# Patient Record
Sex: FEMALE | NOT HISPANIC OR LATINO | Employment: FULL TIME | URBAN - METROPOLITAN AREA
[De-identification: names, ages, dates, MRNs, and addresses within clinical notes are randomized per-mention and may not be internally consistent; named-entity substitution may affect disease eponyms.]

---

## 2017-10-30 ENCOUNTER — OFFICE VISIT (OUTPATIENT)
Dept: FAMILY MEDICINE | Facility: CLINIC | Age: 52
End: 2017-10-30
Payer: COMMERCIAL

## 2017-10-30 VITALS — TEMPERATURE: 98.1 F | DIASTOLIC BLOOD PRESSURE: 60 MMHG | SYSTOLIC BLOOD PRESSURE: 100 MMHG

## 2017-10-30 DIAGNOSIS — Z71.89 OTHER SPECIFIED COUNSELING: ICD-10-CM

## 2017-10-30 DIAGNOSIS — Z71.84 TRAVEL ADVICE ENCOUNTER: Primary | ICD-10-CM

## 2017-10-30 PROCEDURE — 99402 PREV MED CNSL INDIV APPRX 30: CPT | Mod: GA | Performed by: NURSE PRACTITIONER

## 2017-10-30 RX ORDER — AZITHROMYCIN 250 MG/1
TABLET, FILM COATED ORAL
Qty: 6 TABLET | Refills: 1 | Status: SHIPPED | OUTPATIENT
Start: 2017-10-30 | End: 2018-08-08

## 2017-10-30 RX ORDER — OSELTAMIVIR PHOSPHATE 75 MG/1
75 CAPSULE ORAL DAILY
Qty: 10 CAPSULE | Refills: 0 | Status: SHIPPED | OUTPATIENT
Start: 2017-10-30 | End: 2018-08-08

## 2017-10-30 RX ORDER — KETOCONAZOLE 20 MG/G
CREAM TOPICAL DAILY
Qty: 15 G | Refills: 0 | Status: SHIPPED | OUTPATIENT
Start: 2017-10-30

## 2017-10-30 RX ORDER — AZITHROMYCIN 500 MG/1
500 TABLET, FILM COATED ORAL DAILY
Qty: 3 TABLET | Refills: 0 | Status: SHIPPED | OUTPATIENT
Start: 2017-10-30 | End: 2017-11-02

## 2017-10-30 NOTE — PATIENT INSTRUCTIONS
Today October 30, 2017 you received the    None today    These appointments can be made as a NURSE ONLY visit.    **It is very important for the vaccinations to be given on the scheduled day(s), this helps ensure you receive the full effectiveness of the vaccine.**    Please call [facility] with any questions [phone]    Thank you for visiting [facility]

## 2017-10-30 NOTE — PROGRESS NOTES
Nurse Note      Itinerary:  [place]      Departure Date: November 2017      Return Date: 11/25/17      Length of Trip 2 weeks      Reason for Travel: [employer] work           Urban or rural: both      Accommodations: Hotel        IMMUNIZATION HISTORY  Have you received any immunizations within the past 4 weeks?  No  Have you ever fainted from having your blood drawn or from an injection?  No  Have you ever had a fever reaction to vaccination?  No  Have you ever had any bad reaction or side effect from any vaccination?  No  Have you ever had hepatitis A or B vaccine?  yes  Do you live (or work closely) with anyone who has AIDS, an AIDS-like condition, any other immune disorder or who is on chemotherapy for cancer?  No  Do you have a family history of immunodeficiency?  No  Have you received any injection of immune globulin or any blood products during the past 12 months?  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  Patient will be departing in  2 week(s) and staying for   2 week(s) and  traveling with [employer].      Patient itinerary :  will be in the [facility] which presents risk for food borne illnesses, motor vehicle accidents and Typhoid. exposure.      Patient's activities will include volunteer work.    Patient's country of birth is [place]    Special medical concerns:  Excerciiseasthma, Hashimotos  Pre-travel questionnaire was completed by patient and reviewed by provider.     Vitals: /60  Temp 98.1  F (36.7  C) (Oral)  BMI= There is no height or weight on file to calculate BMI.    EXAM:  General:  Well-nourished, well-developed in no acute distress.  Appears to be stated age, interacts appropriately and expresses understanding of information given to patient.    Current Outpatient Prescriptions   Medication Sig Dispense Refill      ARMOUR THYROID PO        Cholecalciferol (VITAMIN D3 PO) Take by mouth daily       Riboflavin (VITAMIN B-2 PO)        Tobramycin-dexamethasone 0.3-0.05 % SUSP Place 1 drop into the right eye 3 times daily 1 Bottle 11     fluticasone-salmeterol (ADVAIR DISKUS) 100-50 MCG/DOSE diskus inhaler Inhale 1 puff into the lungs.       montelukast (SINGULAIR) 10 MG tablet Take 10 mg by mouth.       Patient Active Problem List   Diagnosis     DAIN (stress urinary incontinence, female)     Other hammer toe (acquired)     Allergies   Allergen Reactions     Latex      Per  H&P, allergic to latex gloves     Sulfa Drugs          Immunizations discussed include:   Hepatitis A:  Up to date  Hepatitis B: Up to date  Influenza: Declined  Is concerned about risks of vaccine despite discussion of facts vs myths  Typhoid: Declined  Not concerned about risk of disease  Rabies: Declined  Not concerned about risk of disease  Yellow Fever: Not indicated  Japanese Encephalitis: Not indicated - risk of disease is minimal low rsk season  Meningococcus: Not indicated  Tetanus/Diphtheria: Up to date  Measles/Mumps/Rubella: Up to date  Cholera: Not needed  Polio: Up to date  Pneumococcal: Up to date  Varicella: Immune by disease history per patient report  Zostavax:  Up to date  HPV:  Not indicated  TB:  Low risk     Altitude Exposure on this trip: no  Past tolerance to Altitude: na    ASSESSMENT/PLAN:    ICD-10-CM    1. Travel advice encounter Z71.89 azithromycin (ZITHROMAX) 250 MG tablet     oseltamivir (TAMIFLU) 75 MG capsule     azithromycin (ZITHROMAX) 500 MG tablet   2. Other specified counseling Z71.89 azithromycin (ZITHROMAX) 250 MG tablet     ketoconazole (NIZORAL) 2 % cream     I have reviewed general recommendations for safe travel   including: food/water precautions, insect precautions, safer sex   practices given high prevalence of Zika, HIV and other STDs,   roadway safety. Educational materials and [employer] report provided.    Rebecca  prophylaxis recommended: none  Symptomatic treatment for traveler's diarrhea: azithromycin  Altitude illness prevention and treatment: non      Evacuation insurance advised and resources were provided to patient.    Total visit time 30 minutes  with over 50% of time spent counseling patient as detailed above.    Callie Valentine CNP    Additional note:  10 minutes  Discussed history of asthma and tendency towards bronchial infections.  Patient requesting Zithromax Zpack for coverage in the event of issues in [place] (Pollution).  I also prescribed Tamiflu as she declines a flu shot and is at risk for resiratory complications of influenza. (can be taken as prevention or treatment and instructions we given on dosing) .   Kenalog refill for skin rashes.   Callie Valentine CNP

## 2017-10-30 NOTE — MR AVS SNAPSHOT
"              After Visit Summary   10/30/2017    Misti Elmore    MRN: [id number]           Patient Information     Date Of Birth          1965        Visit Information        Provider Department      10/30/2017 8:00 AM Callie Valentine APRN CNP [facility]        Today's Diagnoses     Travel advice encounter    -  1    Other specified counseling          Care Instructions    Today October 30, 2017 you received the    None today    These appointments can be made as a NURSE ONLY visit.    **It is very important for the vaccinations to be given on the scheduled day(s), this helps ensure you receive the full effectiveness of the vaccine.**    Please call [facility] with any questions [phone]    Thank you for visiting [facility]              Follow-ups after your visit        Who to contact     If you have questions or need follow up information about today's clinic visit or your schedule please contact [facility] directly at [phone].  Normal or non-critical lab and imaging results will be communicated to you by Appseehart, letter or phone within 4 business days after the clinic has received the results. If you do not hear from us within 7 days, please contact the clinic through Appseehart or phone. If you have a critical or abnormal lab result, we will notify you by phone as soon as possible.  Submit refill requests through [employer] or call your pharmacy and they will forward the refill request to us. Please allow 3 business days for your refill to be completed.          Additional Information About Your Visit        MyChart Information     [employer] lets you send messages to your doctor, view your test results, renew your prescriptions, schedule appointments and more. To sign up, go to [email or web address] . Click on \"Log in\" on the left side of the screen, which will take you to the Welcome page. Then click on \"Sign up Now\" on the right " side of the page.     You will be asked to enter the access code listed below, as well as some personal information. Please follow the directions to create your username and password.     Your access code is: [email or web address]  Expires: 2018  8:49 AM     Your access code will  in 90 days. If you need help or a new code, please call your [place] clinic or [phone].        Care EveryWhere ID     This is your Care EveryWhere ID. This could be used by other organizations to access your [place] medical records  [id number]        Your Vitals Were     Temperature                   98.1  F (36.7  C) (Oral)            Blood Pressure from Last 3 Encounters:   10/30/17 100/60   02/15/16 117/75   09/15/14 90/60    Weight from Last 3 Encounters:   02/15/16 199 lb (90.3 kg)   09/15/14 186 lb (84.4 kg)   14 183 lb (83 kg)              Today, you had the following     No orders found for display         Today's Medication Changes          These changes are accurate as of: 10/30/17  8:49 AM.  If you have any questions, ask your nurse or doctor.               Start taking these medicines.        Dose/Directions    ketoconazole 2 % cream   Commonly known as:  NIZORAL   Used for:  Other specified counseling   Started by:  Callie Valentine APRN CNP        Apply topically daily   Quantity:  15 g   Refills:  0       oseltamivir 75 MG capsule   Commonly known as:  TAMIFLU   Used for:  Travel advice encounter   Started by:  Callie Valentine APRN CNP        Dose:  75 mg   Take 1 capsule (75 mg) by mouth daily   Quantity:  10 capsule   Refills:  0         These medicines have changed or have updated prescriptions.        Dose/Directions    * azithromycin 250 MG tablet   Commonly known as:  ZITHROMAX   This may have changed:  Another medication with the same name was added. Make sure you understand how and when to take each.   Used for:  Travel advice encounter, Other specified counseling   Changed by:  Callie Valentine  KELLEE Washington CNP        Two tablets first day, then one tablet daily for four days.   Quantity:  6 tablet   Refills:  1       * azithromycin 500 MG tablet   Commonly known as:  ZITHROMAX   This may have changed:  You were already taking a medication with the same name, and this prescription was added. Make sure you understand how and when to take each.   Used for:  Travel advice encounter   Changed by:  Callie Valentine APRN CNP        Dose:  500 mg   Take 1 tablet (500 mg) by mouth daily for 3 doses Take 1 tablet a day for up to 3 days for severe diarrhea   Quantity:  3 tablet   Refills:  0       * Notice:  This list has 2 medication(s) that are the same as other medications prescribed for you. Read the directions carefully, and ask your doctor or other care provider to review them with you.         Where to get your medicines      These medications were sent to [facility] [id number] [place] [address] [address] AT [address]  [address] [facility] [address]     Phone:  [phone]     azithromycin 250 MG tablet    azithromycin 500 MG tablet    ketoconazole 2 % cream    oseltamivir 75 MG capsule                Primary Care Provider Office Phone # Fax #    [facility] [phone]       [address] [place] [address], [address]  [facility] [address]        Equal Access to Services     SILVESTRE GARCIA AH: Hadii darcie ku hadasho Soomaali, waaxda luqadaha, qaybta kaalmada adeegyada, waxbasia romero haysarika alatorre. So [facility] [phone].    ATENCIÓN: Si habla español, tiene a izquierdo disposición servicios gratuitos de asistencia lingüística. Llame al [phone].    We comply with applicable federal civil rights laws and [place] laws. We do not discriminate on the basis of race, color, national origin, age, disability, sex, sexual orientation, or gender identity.            Thank you!     Thank you for choosing [facility]  for your care. Our goal  is always to provide you with excellent care. Hearing back from our patients is one way we can continue to improve our services. Please take a few minutes to complete the written survey that you may receive in the mail after your visit with us. Thank you!             Your Updated Medication List - Protect others around you: Learn how to safely use, store and throw away your medicines at www.disposemymeds.org.          This list is accurate as of: 10/30/17  8:49 AM.  Always use your most recent med list.                   Brand Name Dispense Instructions for use Diagnosis    ADVAIR DISKUS 100-50 MCG/DOSE diskus inhaler   Generic drug:  fluticasone-salmeterol      Inhale 1 puff into the lungs.        ARMOUR THYROID PO           * azithromycin 250 MG tablet    ZITHROMAX    6 tablet    Two tablets first day, then one tablet daily for four days.    Travel advice encounter, Other specified counseling       * azithromycin 500 MG tablet    ZITHROMAX    3 tablet    Take 1 tablet (500 mg) by mouth daily for 3 doses Take 1 tablet a day for up to 3 days for severe diarrhea    Travel advice encounter       ketoconazole 2 % cream    NIZORAL    15 g    Apply topically daily    Other specified counseling       montelukast 10 MG tablet    SINGULAIR     Take 10 mg by mouth.        oseltamivir 75 MG capsule    TAMIFLU    10 capsule    Take 1 capsule (75 mg) by mouth daily    Travel advice encounter       tobramycin-dexamethasone 0.3-0.05 % Susp     1 Bottle    Place 1 drop into the right eye 3 times daily    Other chronic allergic conjunctivitis       VITAMIN B-2 PO           VITAMIN D3 PO      Take by mouth daily        * Notice:  This list has 2 medication(s) that are the same as other medications prescribed for you. Read the directions carefully, and ask your doctor or other care provider to review them with you.

## 2017-10-30 NOTE — NURSING NOTE
"Chief Complaint   Patient presents with     [facility]     initial /60  Temp 98.1  F (36.7  C) (Oral) Estimated body mass index is 35.25 kg/(m^2) as calculated from the following:    Height as of 9/15/14: 5' 3\" (1.6 m).    Weight as of 2/15/16: 199 lb (90.3 kg).  BP completed using cuff size: regular.  L   arm      Health Maintenance that is potentially due pending provider review:  NONE    n/a    Alberto Hull ma  "

## 2018-03-13 ENCOUNTER — HOSPITAL ENCOUNTER (OUTPATIENT)
Dept: MAMMOGRAPHY | Facility: CLINIC | Age: 53
Discharge: HOME | End: 2018-03-13
Payer: COMMERCIAL

## 2018-03-13 DIAGNOSIS — Z12.31 ENCOUNTER FOR SCREENING MAMMOGRAM FOR HIGH-RISK PATIENT: ICD-10-CM

## 2018-03-13 PROCEDURE — 77063 BREAST TOMOSYNTHESIS BI: CPT

## 2018-05-11 ENCOUNTER — HOSPITAL ENCOUNTER (OUTPATIENT)
Facility: CLINIC | Age: 53
Discharge: HOME | End: 2018-05-11
Attending: COLON & RECTAL SURGERY | Admitting: COLON & RECTAL SURGERY
Payer: COMMERCIAL

## 2018-05-11 VITALS
SYSTOLIC BLOOD PRESSURE: 112 MMHG | DIASTOLIC BLOOD PRESSURE: 75 MMHG | RESPIRATION RATE: 14 BRPM | OXYGEN SATURATION: 97 % | BODY MASS INDEX: 33.66 KG/M2 | WEIGHT: 190 LBS | HEIGHT: 63 IN

## 2018-05-11 LAB — COLONOSCOPY: NORMAL

## 2018-05-11 PROCEDURE — 25000128 H RX IP 250 OP 636: Performed by: COLON & RECTAL SURGERY

## 2018-05-11 PROCEDURE — G0500 MOD SEDAT ENDO SERVICE >5YRS: HCPCS | Performed by: COLON & RECTAL SURGERY

## 2018-05-11 PROCEDURE — 45378 DIAGNOSTIC COLONOSCOPY: CPT | Performed by: COLON & RECTAL SURGERY

## 2018-05-11 PROCEDURE — G0121 COLON CA SCRN NOT HI RSK IND: HCPCS | Performed by: COLON & RECTAL SURGERY

## 2018-05-11 RX ORDER — ONDANSETRON 2 MG/ML
4 INJECTION INTRAMUSCULAR; INTRAVENOUS EVERY 6 HOURS PRN
Status: DISCONTINUED | OUTPATIENT
Start: 2018-05-11 | End: 2018-05-11 | Stop reason: HOSPADM

## 2018-05-11 RX ORDER — FLUMAZENIL 0.1 MG/ML
0.2 INJECTION, SOLUTION INTRAVENOUS
Status: DISCONTINUED | OUTPATIENT
Start: 2018-05-11 | End: 2018-05-11 | Stop reason: HOSPADM

## 2018-05-11 RX ORDER — FENTANYL CITRATE 50 UG/ML
INJECTION, SOLUTION INTRAMUSCULAR; INTRAVENOUS PRN
Status: DISCONTINUED | OUTPATIENT
Start: 2018-05-11 | End: 2018-05-11 | Stop reason: HOSPADM

## 2018-05-11 RX ORDER — ONDANSETRON 2 MG/ML
4 INJECTION INTRAMUSCULAR; INTRAVENOUS
Status: DISCONTINUED | OUTPATIENT
Start: 2018-05-11 | End: 2018-05-11 | Stop reason: HOSPADM

## 2018-05-11 RX ORDER — NALOXONE HYDROCHLORIDE 0.4 MG/ML
.1-.4 INJECTION, SOLUTION INTRAMUSCULAR; INTRAVENOUS; SUBCUTANEOUS
Status: DISCONTINUED | OUTPATIENT
Start: 2018-05-11 | End: 2018-05-11 | Stop reason: HOSPADM

## 2018-05-11 RX ORDER — LIDOCAINE 40 MG/G
CREAM TOPICAL
Status: DISCONTINUED | OUTPATIENT
Start: 2018-05-11 | End: 2018-05-11 | Stop reason: HOSPADM

## 2018-05-11 RX ORDER — ONDANSETRON 4 MG/1
4 TABLET, ORALLY DISINTEGRATING ORAL EVERY 6 HOURS PRN
Status: DISCONTINUED | OUTPATIENT
Start: 2018-05-11 | End: 2018-05-11 | Stop reason: HOSPADM

## 2018-05-11 NOTE — H&P
Pre-Endoscopy History and Physical     Misti Elmore MRN# [id number]   YOB: 1965 Age: 53 year old     Date of Procedure: 5/11/2018  Primary care provider: [facility]  Type of Endoscopy: Colonoscopy  Reason for Procedure: screening  Type of Anesthesia Anticipated: Moderate Sedation    HPI:    Misti is a 53 year old female who will be undergoing the above procedure.      A history and physical has been performed. The patient's medications and allergies have been reviewed. The risks and benefits of the procedure and the sedation options and risks were discussed with the patient.  All questions were answered and informed consent was obtained.      She denies a personal or family history of anesthesia complications or bleeding disorders.     Allergies   Allergen Reactions     Latex      Per  H&P, allergic to latex gloves     Sulfa Drugs      Got red and irritated?          No current facility-administered medications on file prior to encounter.   Current Outpatient Prescriptions on File Prior to Encounter:  ARMOUR THYROID PO    Cholecalciferol (VITAMIN D3 PO) Take by mouth daily   azithromycin (ZITHROMAX) 250 MG tablet Two tablets first day, then one tablet daily for four days.   fluticasone-salmeterol (ADVAIR DISKUS) 100-50 MCG/DOSE diskus inhaler Inhale 1 puff into the lungs.   ketoconazole (NIZORAL) 2 % cream Apply topically daily   montelukast (SINGULAIR) 10 MG tablet Take 10 mg by mouth.   oseltamivir (TAMIFLU) 75 MG capsule Take 1 capsule (75 mg) by mouth daily   Tobramycin-dexamethasone 0.3-0.05 % SUSP Place 1 drop into the right eye 3 times daily       Patient Active Problem List   Diagnosis     DAIN (stress urinary incontinence, female)     Other hammer toe (acquired)        Past Medical History:   Diagnosis Date     Asthma     exercise induced     Rotator cuff injury     right     Thyroid disease     hypothyroidism     Unspecified symptom associated with female  "genital organs         Past Surgical History:   Procedure Laterality Date     GYN SURGERY        section x3 and tubal ligation     SLING TRANSVAGINAL  2011    Procedure:SLING TRANSVAGINAL; Suburethral Soleyx Sling with cysto (LATEX ALLERGY - CONTACT); Surgeon:JEANETTE MAYA; Location:[facility]       Social History   Substance Use Topics     Smoking status: Never Smoker     Smokeless tobacco: Never Used     Alcohol use Yes      Comment: 1 X PER MONTH       Family History   Problem Relation Age of Onset     Strabismus No family hx of        REVIEW OF SYSTEMS:     5 point ROS negative except as noted above in HPI, including Gen., Resp., CV, GI &  system review.      PHYSICAL EXAM:   /83  Resp 16  Ht 1.588 m (5' 2.5\")  Wt 86.2 kg (190 lb)  SpO2 96%  BMI 34.2 kg/m2 Estimated body mass index is 34.2 kg/(m^2) as calculated from the following:    Height as of this encounter: 1.588 m (5' 2.5\").    Weight as of this encounter: 86.2 kg (190 lb).   GENERAL APPEARANCE: healthy and alert  MENTAL STATUS: alert  AIRWAY EXAM: Mallampatti Class I (visualization of the soft palate, fauces, uvula, anterior and posterior pillars)  RESP: lungs clear to auscultation - no rales, rhonchi or wheezes  CV: regular rates and rhythm      IMPRESSION   ASA Class 2 - Mild systemic disease        PLAN:     Plan for colonoscopy. We discussed the risks, benefits and alternatives and the patient wished to proceed.    The above has been forwarded to the consulting provider.      Cindy Lares MD  [facility]  Phone: [phone]  Fax: [phone]  May 11, 2018    "

## 2018-07-22 DIAGNOSIS — Z71.84 TRAVEL ADVICE ENCOUNTER: ICD-10-CM

## 2018-07-23 RX ORDER — AZITHROMYCIN 500 MG/1
TABLET, FILM COATED ORAL
Start: 2018-07-23

## 2018-08-08 ENCOUNTER — OFFICE VISIT (OUTPATIENT)
Dept: URGENT CARE | Facility: URGENT CARE | Age: 53
End: 2018-08-08
Payer: COMMERCIAL

## 2018-08-08 ENCOUNTER — RADIANT APPOINTMENT (OUTPATIENT)
Dept: GENERAL RADIOLOGY | Facility: CLINIC | Age: 53
End: 2018-08-08
Attending: PHYSICIAN ASSISTANT
Payer: COMMERCIAL

## 2018-08-08 VITALS
DIASTOLIC BLOOD PRESSURE: 85 MMHG | OXYGEN SATURATION: 97 % | SYSTOLIC BLOOD PRESSURE: 129 MMHG | TEMPERATURE: 98 F | HEART RATE: 78 BPM

## 2018-08-08 DIAGNOSIS — R82.90 FOUL SMELLING URINE: ICD-10-CM

## 2018-08-08 DIAGNOSIS — R05.9 COUGH: ICD-10-CM

## 2018-08-08 DIAGNOSIS — J22 LOWER RESP. TRACT INFECTION: Primary | ICD-10-CM

## 2018-08-08 LAB
ALBUMIN UR-MCNC: NEGATIVE MG/DL
APPEARANCE UR: CLEAR
BACTERIA #/AREA URNS HPF: ABNORMAL /HPF
BILIRUB UR QL STRIP: NEGATIVE
COLOR UR AUTO: YELLOW
GLUCOSE UR STRIP-MCNC: NEGATIVE MG/DL
HGB UR QL STRIP: ABNORMAL
KETONES UR STRIP-MCNC: NEGATIVE MG/DL
LEUKOCYTE ESTERASE UR QL STRIP: NEGATIVE
NITRATE UR QL: NEGATIVE
PH UR STRIP: 5 PH (ref 5–7)
RBC #/AREA URNS AUTO: ABNORMAL /HPF
SOURCE: ABNORMAL
SP GR UR STRIP: 1.02 (ref 1–1.03)
UROBILINOGEN UR STRIP-ACNC: 0.2 EU/DL (ref 0.2–1)
WBC #/AREA URNS AUTO: ABNORMAL /HPF

## 2018-08-08 PROCEDURE — 71046 X-RAY EXAM CHEST 2 VIEWS: CPT

## 2018-08-08 PROCEDURE — 81001 URINALYSIS AUTO W/SCOPE: CPT | Performed by: PHYSICIAN ASSISTANT

## 2018-08-08 PROCEDURE — 99213 OFFICE O/P EST LOW 20 MIN: CPT | Performed by: PHYSICIAN ASSISTANT

## 2018-08-08 RX ORDER — AZITHROMYCIN 250 MG/1
TABLET, FILM COATED ORAL
Qty: 6 TABLET | Refills: 0 | Status: SHIPPED | OUTPATIENT
Start: 2018-08-08 | End: 2021-02-02

## 2018-08-08 NOTE — MR AVS SNAPSHOT
After Visit Summary   8/8/2018    Misti Elmore    MRN: [id number]           Patient Information     Date Of Birth          1965        Visit Information        Provider Department      8/8/2018 7:45 PM Gillian Carrillo PA-C [facility]        Today's Diagnoses     Cough    -  1    Foul smelling urine        Lower resp. tract infection          Care Instructions    Guaifenesin for expectorant.       Preventing Common Respiratory Infections  Respiratory infections such as colds and influenza ( the flu ) are common in winter. These infections are often caused by viruses. They may share some symptoms, but not all respiratory infections are the same. Some make you more sick than others. You can take steps to prevent common respiratory infections. And if you get sick, you can take care of yourself to keep the infection from getting worse.    What is a cold?    Symptoms include runny nose, coughing and sneezing, and sore throat. Cold symptoms tend to be milder than flu symptoms.    Symptoms tend to come on slowly. They last for a few days to about a week.    With a cold, you can still do most of the things you usually do.  What is the flu?    Symptoms include fever, headache, fatigue, cough, sore throat, runny nose, and muscle aches. Children may have upset stomach and vomiting, but adults usually don t.    Symptoms tend to come on quickly. Some, such as fatigue and cough, can last a few weeks.    With the flu, you may feel worn out and not able to do normal activities.    It s most likely NOT the flu if an adult has vomiting or diarrhea for a day or two. This so-called  stomach flu  is probably a GI (gastrointestinal) infection.  When the infection gets worse  Without proper care, a respiratory infection can get worse. It can lead to serious complications and death. If you aren t getting better, call your healthcare provider. Complications can include:    Bronchitis  (infection of the airways that leads to shortness of breath and coughing up thick yellow or green mucus)    Pneumonia (infection of the lungs in which fluid and mucus settle in the lungs, making breathing difficult)    Worsening of chronic conditions such as heart failure, chronic lung disease, asthma, or diabetes    Severe dehydration (loss of fluids)    Sinus problems    Ear infections   Get a flu vaccine  A flu vaccine protects you from influenza (but not other colds or infections). Get a vaccine each fall, before flu season starts. This can be done at a clinic, healthcare provider s office, drugstore, [facility] center, or through your workplace.  Get pneumococcal vaccines  Pneumonia can be a complication of influenza. There are 2 pneumococcal pneumonia vaccines that protect against many types of pneumonia. Talk with your healthcare provider about these important vaccines.   Keep germs from spreading  No one likes getting sick. To protect yourself and others from cold and flu germs:    Wash your hands often. Use alcohol-based hand  when you don t have access to soap and water.    Don t touch your eyes, nose, and mouth. This may help you keep germs out of your body.    Try to avoid people with respiratory infections. You may want to stay out of crowds during flu season (winter).    Ask your healthcare provider if you should get a pneumonia vaccination.  How to wash your hands    Use warm water and plenty of soap. Work up a good lather.    Clean your whole hand, under your nails, between your fingers, and up your wrists. Wash for at least 15 to 20 seconds. Don t just wipe--rub well.    Rinse. Let the water run down your fingertips, not up your wrists.    In a public restroom, use a paper towel to turn off the faucet and open the door.   Date Last Reviewed: 12/1/2016 2000-2017 [employer]. [address] [place] [address], [place] [address]. All rights reserved. This information is not intended as a  substitute for professional medical care. Always follow your healthcare professional's instructions.                Follow-ups after your visit        Who to contact     If you have questions or need follow up information about today's clinic visit or your schedule please contact [facility] directly at [phone].  Normal or non-critical lab and imaging results will be communicated to you by MyChart, letter or phone within 4 business days after the clinic has received the results. If you do not hear from us within 7 days, please contact the clinic through MyChart or phone. If you have a critical or abnormal lab result, we will notify you by phone as soon as possible.  Submit refill requests through [employer] or call your pharmacy and they will forward the refill request to us. Please allow 3 business days for your refill to be completed.          Additional Information About Your Visit        Care EveryWhere ID     This is your Care EveryWhere ID. This could be used by other organizations to access your [facility] medical records  [id number]        Your Vitals Were     Pulse Temperature Pulse Oximetry             78 98  F (36.7  C) (Oral) 97%          Blood Pressure from Last 3 Encounters:   08/08/18 129/85   05/11/18 112/75   10/30/17 100/60    Weight from Last 3 Encounters:   05/11/18 190 lb (86.2 kg)   02/15/16 199 lb (90.3 kg)   09/15/14 186 lb (84.4 kg)              We Performed the Following     UA with Microscopic reflex to Culture          Today's Medication Changes          These changes are accurate as of 8/8/18  8:35 PM.  If you have any questions, ask your nurse or doctor.               Stop taking these medicines if you haven't already. Please contact your care team if you have questions.     oseltamivir 75 MG capsule   Commonly known as:  TAMIFLU   Stopped by:  Gillian Carrillo PA-C           tobramycin-dexamethasone 0.3-0.05 % Susp   Stopped by:  Gillian Carrillo  BELINDA                Where to get your medicines      These medications were sent to [id number] - [place], [place] - [address] & [facility]  [address] [place] [address]    Hours:  24-hours Phone:  [phone]     azithromycin 250 MG tablet                Primary Care Provider Office Phone # Fax #    [place] [facility] [phone]       [facility] [facility], [address]  [place] [address]        Equal Access to Services     [facility]: Hadii aad ku hadasho Soomaali, waaxda luqadaha, qaybta kaalmada adeegyada, waxay idiin hayaan sabina edward . So [facility] [phone].    ATENCIÓN: Si habla español, tiene a izquierdo disposición servicios gratuitos de asistencia lingüística. Ll[facility] [phone].    We comply with applicable federal civil rights laws and [place] laws. We do not discriminate on the basis of race, color, national origin, age, disability, sex, sexual orientation, or gender identity.            Thank you!     Thank you for choosing [facility]  for your care. Our goal is always to provide you with excellent care. Hearing back from our patients is one way we can continue to improve our services. Please take a few minutes to complete the written survey that you may receive in the mail after your visit with us. Thank you!             Your Updated Medication List - Protect others around you: Learn how to safely use, store and throw away your medicines at www.disposemymeds.org.          This list is accurate as of 8/8/18  8:35 PM.  Always use your most recent med list.                   Brand Name Dispense Instructions for use Diagnosis    ADVAIR DISKUS 100-50 MCG/DOSE diskus inhaler   Generic drug:  fluticasone-salmeterol      Inhale 1 puff into the lungs.        ARMOUR THYROID PO           azithromycin 250 MG tablet    ZITHROMAX    6 tablet    Two tablets first day, then one tablet daily for four days.    Lower  resp. tract infection       B12-ACTIVE PO           ketoconazole 2 % cream    NIZORAL    15 g    Apply topically daily    Other specified counseling       montelukast 10 MG tablet    SINGULAIR     Take 10 mg by mouth.        VITAMIN D3 PO      Take by mouth daily

## 2018-08-09 NOTE — PROGRESS NOTES
SUBJECTIVE:  iMsti Elmore is a 53 year old female who presents to the clinic today with a chief complaint of cough  for 6 week(s).  Her cough is described as persistent and productive of yellow sputum.    The patient's symptoms are moderate and stable.  Associated symptoms include fever. The patient's symptoms are exacerbated by no particular triggers  Patient has been using OTC   to improve symptoms.    Past Medical History:   Diagnosis Date     Asthma     exercise induced     Rotator cuff injury     right     Thyroid disease     hypothyroidism     Unspecified symptom associated with female genital organs        Current Outpatient Prescriptions   Medication Sig Dispense Refill     ARMOUR THYROID PO        Cholecalciferol (VITAMIN D3 PO) Take by mouth daily       fluticasone-salmeterol (ADVAIR DISKUS) 100-50 MCG/DOSE diskus inhaler Inhale 1 puff into the lungs.       ketoconazole (NIZORAL) 2 % cream Apply topically daily 15 g 0     Methylcobalamin (B12-ACTIVE PO)        montelukast (SINGULAIR) 10 MG tablet Take 10 mg by mouth.         Social History   Substance Use Topics     Smoking status: Never Smoker     Smokeless tobacco: Never Used     Alcohol use Yes      Comment: 1 X PER MONTH       ROS  Review of systems negative except as stated above.    OBJECTIVE:  /85  Pulse 78  Temp 98  F (36.7  C) (Oral)  SpO2 97%  GENERAL APPEARANCE: healthy, alert and no distress  EYES: EOMI,  PERRL, conjunctiva clear  HENT: ear canals and TM's normal.  Nose and mouth without ulcers, erythema or lesions  NECK: supple, nontender, no lymphadenopathy  RESP: lungs clear to auscultation - no rales, rhonchi or wheezes  CV: regular rates and rhythm, normal S1 S2, no murmur noted  NEURO: Normal strength and tone, sensory exam grossly normal,  normal speech and mentation  SKIN: no suspicious lesions or rashes    Results for orders placed or performed in visit on 08/08/18   UA with Microscopic reflex to Culture   Result Value Ref  Range    Color Urine Yellow     Appearance Urine Clear     Glucose Urine Negative NEG^Negative mg/dL    Bilirubin Urine Negative NEG^Negative    Ketones Urine Negative NEG^Negative mg/dL    Specific Gravity Urine 1.020 1.003 - 1.035    pH Urine 5.0 5.0 - 7.0 pH    Protein Albumin Urine Negative NEG^Negative mg/dL    Urobilinogen Urine 0.2 0.2 - 1.0 EU/dL    Nitrite Urine Negative NEG^Negative    Blood Urine Trace (A) NEG^Negative    Leukocyte Esterase Urine Negative NEG^Negative    Source Midstream Urine     WBC Urine 0 - 5 OTO5^0 - 5 /HPF    RBC Urine O - 2 OTO2^O - 2 /HPF    Bacteria Urine Few (A) NEG^Negative /HPF     XR -- no sign of acute infiltrate    ASSESSMENT / PLAN:  1. Lower resp. tract infection  Mucinex to be used as an expectorant.  Symptomatic measures encouraged, humidified air, plenty of fluids.  - azithromycin (ZITHROMAX) 250 MG tablet; Two tablets first day, then one tablet daily for four days.  Dispense: 6 tablet; Refill: 0    2. Foul smelling urine  No sign of infection noted in urine  - UA with Microscopic reflex to Culture      Gillian Carrillo PA-C

## 2018-08-09 NOTE — PATIENT INSTRUCTIONS
Guaifenesin for expectorant.       Preventing Common Respiratory Infections  Respiratory infections such as colds and influenza ( the flu ) are common in winter. These infections are often caused by viruses. They may share some symptoms, but not all respiratory infections are the same. Some make you more sick than others. You can take steps to prevent common respiratory infections. And if you get sick, you can take care of yourself to keep the infection from getting worse.    What is a cold?    Symptoms include runny nose, coughing and sneezing, and sore throat. Cold symptoms tend to be milder than flu symptoms.    Symptoms tend to come on slowly. They last for a few days to about a week.    With a cold, you can still do most of the things you usually do.  What is the flu?    Symptoms include fever, headache, fatigue, cough, sore throat, runny nose, and muscle aches. Children may have upset stomach and vomiting, but adults usually don t.    Symptoms tend to come on quickly. Some, such as fatigue and cough, can last a few weeks.    With the flu, you may feel worn out and not able to do normal activities.    It s most likely NOT the flu if an adult has vomiting or diarrhea for a day or two. This so-called  stomach flu  is probably a GI (gastrointestinal) infection.  When the infection gets worse  Without proper care, a respiratory infection can get worse. It can lead to serious complications and death. If you aren t getting better, call your healthcare provider. Complications can include:    Bronchitis (infection of the airways that leads to shortness of breath and coughing up thick yellow or green mucus)    Pneumonia (infection of the lungs in which fluid and mucus settle in the lungs, making breathing difficult)    Worsening of chronic conditions such as heart failure, chronic lung disease, asthma, or diabetes    Severe dehydration (loss of fluids)    Sinus problems    Ear infections   Get a flu vaccine  A  flu vaccine protects you from influenza (but not other colds or infections). Get a vaccine each fall, before flu season starts. This can be done at a clinic, healthcare provider s office, drugstore, senior center, or through your workplace.  Get pneumococcal vaccines  Pneumonia can be a complication of influenza. There are 2 pneumococcal pneumonia vaccines that protect against many types of pneumonia. Talk with your healthcare provider about these important vaccines.   Keep germs from spreading  No one likes getting sick. To protect yourself and others from cold and flu germs:    Wash your hands often. Use alcohol-based hand  when you don t have access to soap and water.    Don t touch your eyes, nose, and mouth. This may help you keep germs out of your body.    Try to avoid people with respiratory infections. You may want to stay out of crowds during flu season (winter).    Ask your healthcare provider if you should get a pneumonia vaccination.  How to wash your hands    Use warm water and plenty of soap. Work up a good lather.    Clean your whole hand, under your nails, between your fingers, and up your wrists. Wash for at least 15 to 20 seconds. Don t just wipe--rub well.    Rinse. Let the water run down your fingertips, not up your wrists.    In a public restroom, use a paper towel to turn off the faucet and open the door.   Date Last Reviewed: 12/1/2016 2000-2017 [employer]. [address] [place] [address], [place] [address]. All rights reserved. This information is not intended as a substitute for professional medical care. Always follow your healthcare professional's instructions.

## 2019-08-05 ENCOUNTER — OFFICE VISIT (OUTPATIENT)
Dept: URGENT CARE | Facility: URGENT CARE | Age: 54
End: 2019-08-05
Payer: COMMERCIAL

## 2019-08-05 VITALS
TEMPERATURE: 97.5 F | OXYGEN SATURATION: 97 % | HEART RATE: 63 BPM | DIASTOLIC BLOOD PRESSURE: 75 MMHG | SYSTOLIC BLOOD PRESSURE: 117 MMHG | RESPIRATION RATE: 16 BRPM

## 2019-08-05 DIAGNOSIS — J20.9 ACUTE BRONCHITIS, UNSPECIFIED ORGANISM: Primary | ICD-10-CM

## 2019-08-05 PROCEDURE — 99214 OFFICE O/P EST MOD 30 MIN: CPT

## 2019-08-05 RX ORDER — AZITHROMYCIN 250 MG/1
TABLET, FILM COATED ORAL
Qty: 6 TABLET | Refills: 0 | Status: SHIPPED | OUTPATIENT
Start: 2019-08-05 | End: 2019-08-10

## 2019-08-05 RX ORDER — ALBUTEROL SULFATE 90 UG/1
2 AEROSOL, METERED RESPIRATORY (INHALATION) EVERY 6 HOURS
Qty: 6.7 G | Refills: 0 | Status: SHIPPED | OUTPATIENT
Start: 2019-08-05 | End: 2021-02-02

## 2019-08-05 RX ORDER — DEXTROMETHORPHAN HBR. AND GUAIFENESIN 10; 100 MG/5ML; MG/5ML
5 SOLUTION ORAL 4 TIMES DAILY PRN
Qty: 118 ML | Refills: 0 | Status: SHIPPED | OUTPATIENT
Start: 2019-08-05 | End: 2021-02-02

## 2019-08-05 RX ORDER — FLUTICASONE PROPIONATE 50 MCG
1 SPRAY, SUSPENSION (ML) NASAL DAILY
Qty: 9.9 ML | Refills: 0 | Status: SHIPPED | OUTPATIENT
Start: 2019-08-05 | End: 2021-02-02

## 2019-08-06 NOTE — PROGRESS NOTES
SUBJECTIVE:   Chief Complaint   Patient presents with     Urgent Care     Cough     X 2 months was seen early on and was advised to start taking musinex would like a z-pack productive cough.        Acute Illness   Concerns: Cough   When did it start? Two months ago  Is it getting better, worse or staying the same? unchanged    Fatigue/Achiness?:No     Fever?: No     Chills/Sweats?: No     Headache (location?)No     Sinus Pressure?:No     Eye redness/Discharge?: No     Ear Pain?: No     Runny nose?:  YES     Congestion?:  YES     Sore Throat?: No   Respiratory    Cough?:  YES-productive of yellow sputum, productive of green sputum    Wheeze?: No   GI/    Nausea?:No     Vomiting?: No     Diarrhea?:  No     Therapies Tried and outcome: OTC cough medication. She has been seen by two different providers in the past and reassured that her symptoms are likely viral. She would like a Z-pack because that has worked for her in the past.       Review of Systems review of system negative except as mentioned in HPI.       Past Medical History:   Diagnosis Date     Asthma     exercise induced     Rotator cuff injury     right     Thyroid disease     hypothyroidism     Unspecified symptom associated with female genital organs      Family History   Problem Relation Age of Onset     Strabismus No family hx of      Current Outpatient Medications   Medication Sig Dispense Refill     albuterol (PROAIR HFA/PROVENTIL HFA/VENTOLIN HFA) 108 (90 Base) MCG/ACT inhaler Inhale 2 puffs into the lungs every 6 hours 6.7 g 0     ARMOUR THYROID PO        azithromycin (ZITHROMAX) 250 MG tablet Take 2 tablets (500 mg) by mouth daily for 1 day, THEN 1 tablet (250 mg) daily for 4 days. 6 tablet 0     Cholecalciferol (VITAMIN D3 PO) Take by mouth daily       dextromethorphan-guaiFENesin (TUSSIN DM)  MG/5ML liquid Take 5 mLs by mouth 4 times daily as needed 118 mL 0     fluticasone (FLONASE) 50 MCG/ACT nasal spray Spray 1 spray into both nostrils  daily 9.9 mL 0     fluticasone-salmeterol (ADVAIR DISKUS) 100-50 MCG/DOSE diskus inhaler Inhale 1 puff into the lungs.       ketoconazole (NIZORAL) 2 % cream Apply topically daily 15 g 0     Methylcobalamin (B12-ACTIVE PO)        montelukast (SINGULAIR) 10 MG tablet Take 10 mg by mouth.       azithromycin (ZITHROMAX) 250 MG tablet Two tablets first day, then one tablet daily for four days. (Patient not taking: Reported on 8/5/2019) 6 tablet 0     Social History     Tobacco Use     Smoking status: Never Smoker     Smokeless tobacco: Never Used   Substance Use Topics     Alcohol use: Yes     Comment: 1 X PER MONTH       OBJECTIVE  /75   Pulse 63   Temp 97.5  F (36.4  C) (Oral)   Resp 16   SpO2 97%     Physical Exam    Labs:  No results found for this or any previous visit (from the past 24 hour(s)).    X-Ray was not done.    ASSESSMENT:    Misti was seen today for urgent care and cough.    Diagnoses and all orders for this visit:    Acute bronchitis, unspecified organism:  Patient presented to the clinic with respiratory symptoms. Her physical exam was unremarkable. Reassured patient that her symptoms are likely viral and secondary to acute bronchitis. She requested for Z-pack considering this is her third provider visit. Shared decision was made to prescribed Z-pack. Discussed symptomatic management with: Tylenol/Ibuprofen as needed, robitussin, flonase and albuterol inhaler. He/She will follow up with PCP as needed   -     azithromycin (ZITHROMAX) 250 MG tablet; Take 2 tablets (500 mg) by mouth daily for 1 day, THEN 1 tablet (250 mg) daily for 4 days.  -     albuterol (PROAIR HFA/PROVENTIL HFA/VENTOLIN HFA) 108 (90 Base) MCG/ACT inhaler; Inhale 2 puffs into the lungs every 6 hours  -     fluticasone (FLONASE) 50 MCG/ACT nasal spray; Spray 1 spray into both nostrils daily  -     dextromethorphan-guaiFENesin (TUSSIN DM)  MG/5ML liquid; Take 5 mLs by mouth 4 times daily as  needed          Followup:    If not improving or if condition worsens, follow up with your Primary Care Provider    Isabel Perales. MD

## 2021-02-02 ENCOUNTER — OFFICE VISIT (OUTPATIENT)
Dept: URGENT CARE | Facility: URGENT CARE | Age: 56
End: 2021-02-02
Payer: COMMERCIAL

## 2021-02-02 VITALS
DIASTOLIC BLOOD PRESSURE: 66 MMHG | SYSTOLIC BLOOD PRESSURE: 105 MMHG | HEART RATE: 71 BPM | WEIGHT: 161 LBS | OXYGEN SATURATION: 95 % | BODY MASS INDEX: 28.98 KG/M2 | TEMPERATURE: 97.9 F

## 2021-02-02 DIAGNOSIS — H92.01 OTALGIA, RIGHT: Primary | ICD-10-CM

## 2021-02-02 PROCEDURE — 99213 OFFICE O/P EST LOW 20 MIN: CPT | Performed by: PHYSICIAN ASSISTANT

## 2021-02-02 RX ORDER — AMOXICILLIN 875 MG
875 TABLET ORAL 2 TIMES DAILY
Qty: 14 TABLET | Refills: 0 | Status: SHIPPED | OUTPATIENT
Start: 2021-02-02 | End: 2021-02-09

## 2021-02-02 RX ORDER — GARLIC 180 MG
30 TABLET, DELAYED RELEASE (ENTERIC COATED) ORAL DAILY
COMMUNITY

## 2021-02-02 RX ORDER — LEVOTHYROXINE, LIOTHYRONINE 76; 18 UG/1; UG/1
TABLET ORAL
COMMUNITY
Start: 2021-01-25

## 2021-02-02 NOTE — PROGRESS NOTES
Assessment & Plan     Otalgia, right  No signs of otitis media on exam. She is having more thickened discharge. Has not tried nasal spray.  Unclear the etiology of her pain. I agreed to try an antibiotic given her worsening symptoms, however I also wanted her to follow up with ENT. She was agreeable. Referral placed.  - OTOLARYNGOLOGY REFERRAL  - amoxicillin (AMOXIL) 875 MG tablet; Take 1 tablet (875 mg) by mouth 2 times daily for 7 days    Return if symptoms worsen or fail to improve.    Shwetha Melendez PA-C  [facility] JOSE JUAN Chappell is a 55 year old who presents to clinic today for the following health issues    HPI       Concern - ear pain- right  Onset: 6 weeks  Description: no discharge just pain  Intensity: 8/10  Progression of Symptoms:  worsening  Accompanying Signs & Symptoms: nasal congestion with thick drainage  Previous history of similar problem: Was seen previously for pain. She was told to try a nasal spray which she did not use.  Therapies tried and outcome: Tylenol    Review of Systems   GENERAL:  No fevers      Objective    /66 (BP Location: Right arm, Patient Position: Sitting, Cuff Size: Adult Regular)   Pulse 71   Temp 97.9  F (36.6  C) (Oral)   Wt 73 kg (161 lb)   LMP 10/09/2011   SpO2 95%   Breastfeeding No   BMI 28.98 kg/m    Body mass index is 28.98 kg/m .  Physical Exam   GENERAL: No acute distress  HEENT: Normocephalic, PERRL, Canals patent, bilateral TM's non-erythematous and non-bulging. Turbinates normal in appearance bilaterally. No tenderness over the frontal or maxillary sinuses. Posterior oropharynx non-erythematous and without exudate.  NECK: No cervical or supraclavicular lymphadenopathy.  NEURO: Alert and non-focal

## 2021-04-09 ENCOUNTER — TELEPHONE (OUTPATIENT)
Dept: SURGERY | Facility: PHYSICIAN GROUP | Age: 56
End: 2021-04-09

## 2021-04-09 ENCOUNTER — OFFICE VISIT (OUTPATIENT)
Dept: SURGERY | Facility: CLINIC | Age: 56
End: 2021-04-09
Payer: COMMERCIAL

## 2021-04-09 VITALS
BODY MASS INDEX: 27.82 KG/M2 | DIASTOLIC BLOOD PRESSURE: 60 MMHG | HEIGHT: 63 IN | SYSTOLIC BLOOD PRESSURE: 102 MMHG | WEIGHT: 157 LBS | HEART RATE: 81 BPM

## 2021-04-09 DIAGNOSIS — D17.30 LIPOMA OF SKIN AND SUBCUTANEOUS TISSUE: Primary | ICD-10-CM

## 2021-04-09 PROCEDURE — 99203 OFFICE O/P NEW LOW 30 MIN: CPT | Performed by: SURGERY

## 2021-04-09 ASSESSMENT — MIFFLIN-ST. JEOR: SCORE: 1261.28

## 2021-04-09 NOTE — PROGRESS NOTES
[facility] General Surgery Clinic Consultation    CHIEF COMPLAINT:  Possible lipoma    HISTORY OF PRESENT ILLNESS:  Misti Elmore is a 56 year old female who is seen in consultation at the request of [facility] for evaluation of a possible lipoma.     She reports she has felt a lump on her lateral left abdominal wall for the past 6-8 years. It has not changed in size as far as she knows. It is not tender. There has been no drainage. No similar lesions anywhere else. It is bothersome to her and she is concerned about possible malignancy.     REVIEW OF SYSTEMS:  10 point review of systems completed and otherwise negative aside from as listed in HPI.     Past Medical History:   Diagnosis Date     Asthma     exercise induced     Rotator cuff injury     right     Thyroid disease     hypothyroidism     Unspecified symptom associated with female genital organs        Past Surgical History:   Procedure Laterality Date     COLONOSCOPY N/A 2018    Procedure: COLONOSCOPY;  colonoscopy;  Surgeon: Cindy Lares MD;  Location:  GI     GYN SURGERY        section x3 and tubal ligation     SLING TRANSVAGINAL  2011    Procedure:SLING TRANSVAGINAL; Suburethral Soleyx Sling with cysto (LATEX ALLERGY - CONTACT); Surgeon:JEANETTE MAYA; Location:[facility]       Family History   Problem Relation Age of Onset     Strabismus No family hx of        Social History     Tobacco Use     Smoking status: Never Smoker     Smokeless tobacco: Never Used   Substance Use Topics     Alcohol use: Yes     Comment: 1 X PER MONTH       Patient Active Problem List   Diagnosis     DAIN (stress urinary incontinence, female)     Other hammer toe (acquired)       Allergies   Allergen Reactions     Latex      Per  H&P, allergic to latex gloves     Sulfa Drugs      Got red and irritated?       Current Outpatient Medications   Medication Sig Dispense Refill     Cholecalciferol (VITAMIN D3 PO) Take by mouth daily        "fluticasone-salmeterol (ADVAIR DISKUS) 100-50 MCG/DOSE diskus inhaler Inhale 1 puff into the lungs.       ginkgo biloba 60 MG CAPS capsule Take 30 mg by mouth daily       ketoconazole (NIZORAL) 2 % cream Apply topically daily 15 g 0     Methylcobalamin (B12-ACTIVE PO)        NP THYROID 120 MG tablet          Vitals: /60   Pulse 81   Ht 1.6 m (5' 3\")   Wt 71.2 kg (157 lb)   LMP 10/09/2011   BMI 27.81 kg/m    BMI= Body mass index is 27.81 kg/m .    EXAM:  GENERAL: healthy, alert and no distress   PSYCH: pleasant, normal affect  HEENT: moist mucus membranes, no scleral icterus  CARDIOVASCULAR:  RRR  RESPIRATORY: non labored breathing  GI: soft, nontender, nondistended, no hernias palpable, no hepatosplenomegaly, normal bowel sounds - on the upper outer abdomen is a 4cm soft tissue mass which is well circumscribed and mobile.   Extremities: warm and well perfused, no edema  SKIN: No suspicious lesions or rashes    LYMPH: no axillary adenopathy    ASSESSMENT/PLAN:  Misti Elmore is a 56 year old  who presents with a soft tissue mass which is clinically consistent with a lipoma vs angiolipoma on her left lateral abdominal wall. It is bothersome to her. We discussed options and she would like to proceed with excision. We discussed the risks, benefits, indications and alternatives to surgery. Plan for excision of the lesion under local anesthetic.     It was my pleasure to participate in the care of Misti Elmore in clinic today. Thank you for this consultation.         Kathleen Elmore MD    Please route or send letter to:  Primary Care Provider (PCP) and Referring Provider      "

## 2021-04-09 NOTE — TELEPHONE ENCOUNTER
Type of surgery: excision soft tissue mass left flank  Location of surgery: [facility] OR  Date and time of surgery: 5/7/21 11:30am  Surgeon: Dr Elmore  Pre-Op Appt Date: pt to schedule  Post-Op Appt Date: pt to schedule   Packet sent out: Yes  Pre-cert/Authorization completed:  Not Applicable  Date: 4/9/21

## 2021-04-27 DIAGNOSIS — Z11.59 ENCOUNTER FOR SCREENING FOR OTHER VIRAL DISEASES: ICD-10-CM

## 2021-05-03 DIAGNOSIS — Z11.59 ENCOUNTER FOR SCREENING FOR OTHER VIRAL DISEASES: ICD-10-CM

## 2021-05-03 LAB
SARS-COV-2 RNA RESP QL NAA+PROBE: NORMAL
SPECIMEN SOURCE: NORMAL

## 2021-05-03 PROCEDURE — U0003 INFECTIOUS AGENT DETECTION BY NUCLEIC ACID (DNA OR RNA); SEVERE ACUTE RESPIRATORY SYNDROME CORONAVIRUS 2 (SARS-COV-2) (CORONAVIRUS DISEASE [COVID-19]), AMPLIFIED PROBE TECHNIQUE, MAKING USE OF HIGH THROUGHPUT TECHNOLOGIES AS DESCRIBED BY CMS-2020-01-R: HCPCS | Performed by: SURGERY

## 2021-05-03 PROCEDURE — U0005 INFEC AGEN DETEC AMPLI PROBE: HCPCS | Performed by: SURGERY

## 2021-05-04 LAB
LABORATORY COMMENT REPORT: NORMAL
SARS-COV-2 RNA RESP QL NAA+PROBE: NEGATIVE
SPECIMEN SOURCE: NORMAL

## 2021-05-07 ENCOUNTER — APPOINTMENT (OUTPATIENT)
Dept: SURGERY | Facility: PHYSICIAN GROUP | Age: 56
End: 2021-05-07
Payer: COMMERCIAL

## 2021-05-07 ENCOUNTER — HOSPITAL ENCOUNTER (OUTPATIENT)
Facility: CLINIC | Age: 56
Discharge: HOME | End: 2021-05-07
Attending: SURGERY | Admitting: SURGERY
Payer: COMMERCIAL

## 2021-05-07 VITALS — TEMPERATURE: 97.2 F | WEIGHT: 157 LBS | BODY MASS INDEX: 27.82 KG/M2 | HEIGHT: 63 IN

## 2021-05-07 DIAGNOSIS — D17.30 LIPOMA OF SKIN AND SUBCUTANEOUS TISSUE: ICD-10-CM

## 2021-05-07 PROCEDURE — 88304 TISSUE EXAM BY PATHOLOGIST: CPT | Mod: 26 | Performed by: PATHOLOGY

## 2021-05-07 PROCEDURE — 11403 EXC TR-EXT B9+MARG 2.1-3CM: CPT | Performed by: SURGERY

## 2021-05-07 PROCEDURE — 88304 TISSUE EXAM BY PATHOLOGIST: CPT | Mod: TC | Performed by: SURGERY

## 2021-05-07 PROCEDURE — 11406 EXC TR-EXT B9+MARG >4.0 CM: CPT | Performed by: SURGERY

## 2021-05-07 PROCEDURE — 12032 INTMD RPR S/A/T/EXT 2.6-7.5: CPT

## 2021-05-07 PROCEDURE — 250N000009 HC RX 250: Performed by: SURGERY

## 2021-05-07 RX ADMIN — LIDOCAINE HYDROCHLORIDE 10 ML: 10; .005 INJECTION, SOLUTION EPIDURAL; INFILTRATION; INTRACAUDAL; PERINEURAL at 11:44

## 2021-05-07 ASSESSMENT — MIFFLIN-ST. JEOR: SCORE: 1261.28

## 2021-05-11 LAB — COPATH REPORT: NORMAL

## 2021-05-13 NOTE — OP NOTE
General Surgery Operative Note - Minors Procedure    Pre-Operative Diagnosis- soft tissue mass left abdominal wall    Post-Operative Diagnosis- same, pathology pending    Procedure- excision of soft tissue mass left abdominal wall     Surgeon- Kathleen Elmore MD    Assistant- None    Anesthesia- local anesthetic    Specimen-soft tissue mass left flank    Procedure  Consent was obtained. A surgical time out was performed. The patient was positioned supine. The patient was prepped and draped in the usual sterile fashion with Chloraprep. Using sterile technique, 1% lidocaine with epinephrine was used to infiltrate the area. After adequate anesthesia was confirmed, a number 15 scalpel was used to make an incision overlying the lesion. Using sharp dissection a fatty, well circumscribed mass measuring 5 cm x 3cm  was found and circumferentially freed.  It was removed in its entirety. There was minimal bleeding and the wound bed was dry.  Cautery was used to assure hemostasis.  The skin was closed in multiple layers with a 3-0 vicryl and 4-0 monocryl. Steri Strips and a sterile dressing were placed.  Patient tolerated the procedure well without complications. All sponge, instrument, and needle counts were correct at the conclusion of the case.      Kathleen Elmore MD  [facility]  [phone]    Please route or send letter to:  Primary Care Provider (PCP) and Referring Provider

## 2021-05-18 ENCOUNTER — HOSPITAL ENCOUNTER (OUTPATIENT)
Dept: MAMMOGRAPHY | Facility: CLINIC | Age: 56
Discharge: HOME | End: 2021-05-18
Attending: OBSTETRICS & GYNECOLOGY | Admitting: OBSTETRICS & GYNECOLOGY
Payer: COMMERCIAL

## 2021-05-18 DIAGNOSIS — Z12.31 OTHER SCREENING MAMMOGRAM: ICD-10-CM

## 2021-05-18 PROCEDURE — 77067 SCR MAMMO BI INCL CAD: CPT

## 2022-01-26 ENCOUNTER — TELEPHONE (OUTPATIENT)
Dept: ENDOCRINOLOGY | Facility: CLINIC | Age: 57
End: 2022-01-26
Payer: COMMERCIAL

## 2022-01-31 NOTE — TELEPHONE ENCOUNTER
Encounter was created for NP Thyroid, but there is no Rx in chart. Pharmacy has Rx for both 90 and 120 mg. I am routing back to clinic to get Rx added to chart.

## 2023-01-02 ENCOUNTER — OFFICE VISIT (OUTPATIENT)
Dept: URGENT CARE | Facility: URGENT CARE | Age: 58
End: 2023-01-02
Payer: COMMERCIAL

## 2023-01-02 ENCOUNTER — ANCILLARY PROCEDURE (OUTPATIENT)
Dept: GENERAL RADIOLOGY | Facility: CLINIC | Age: 58
End: 2023-01-02
Attending: PHYSICIAN ASSISTANT
Payer: COMMERCIAL

## 2023-01-02 VITALS
BODY MASS INDEX: 31.53 KG/M2 | OXYGEN SATURATION: 95 % | HEART RATE: 105 BPM | SYSTOLIC BLOOD PRESSURE: 106 MMHG | TEMPERATURE: 99 F | WEIGHT: 178 LBS | DIASTOLIC BLOOD PRESSURE: 70 MMHG

## 2023-01-02 DIAGNOSIS — R06.02 SOB (SHORTNESS OF BREATH): ICD-10-CM

## 2023-01-02 DIAGNOSIS — R50.9 FEVER, UNSPECIFIED FEVER CAUSE: Primary | ICD-10-CM

## 2023-01-02 DIAGNOSIS — J11.1 INFLUENZA-LIKE ILLNESS: ICD-10-CM

## 2023-01-02 LAB
FLUAV AG SPEC QL IA: NEGATIVE
FLUBV AG SPEC QL IA: NEGATIVE

## 2023-01-02 PROCEDURE — 99214 OFFICE O/P EST MOD 30 MIN: CPT | Performed by: PHYSICIAN ASSISTANT

## 2023-01-02 PROCEDURE — 87804 INFLUENZA ASSAY W/OPTIC: CPT | Performed by: PHYSICIAN ASSISTANT

## 2023-01-02 PROCEDURE — 71046 X-RAY EXAM CHEST 2 VIEWS: CPT | Mod: TC | Performed by: RADIOLOGY

## 2023-01-02 RX ORDER — ALBUTEROL SULFATE 1.25 MG/3ML
1.25 SOLUTION RESPIRATORY (INHALATION) EVERY 6 HOURS PRN
COMMUNITY
End: 2023-01-02

## 2023-01-02 RX ORDER — GUAIFENESIN 600 MG/1
1200 TABLET, EXTENDED RELEASE ORAL 2 TIMES DAILY
Qty: 30 TABLET | Refills: 0 | Status: SHIPPED | OUTPATIENT
Start: 2023-01-02

## 2023-01-02 RX ORDER — ALBUTEROL SULFATE 1.25 MG/3ML
1.25 SOLUTION RESPIRATORY (INHALATION) EVERY 6 HOURS PRN
Qty: 90 ML | Refills: 0 | Status: SHIPPED | OUTPATIENT
Start: 2023-01-02

## 2023-01-02 RX ORDER — OSELTAMIVIR PHOSPHATE 75 MG/1
75 CAPSULE ORAL 2 TIMES DAILY
Qty: 10 CAPSULE | Refills: 0 | Status: SHIPPED | OUTPATIENT
Start: 2023-01-02 | End: 2023-01-07

## 2023-01-02 NOTE — PROGRESS NOTES
Chief Complaint   Patient presents with     Urgent Care     Fever     Fever, coughing up stuff x3 days. Negative covid test at home.        ASSESSMENT/PLAN:  Misti was seen today for urgent care and fever.    Diagnoses and all orders for this visit:    Fever, unspecified fever cause  -     Influenza A & B Antigen - Clinic Collect    Influenza-like illness  -     albuterol (ACCUNEB) 1.25 MG/3ML neb solution; Take 1 vial (1.25 mg) by nebulization every 6 hours as needed for shortness of breath, wheezing or cough  -     guaiFENesin (MUCINEX) 600 MG 12 hr tablet; Take 2 tablets (1,200 mg) by mouth 2 times daily  -     oseltamivir (TAMIFLU) 75 MG capsule; Take 1 capsule (75 mg) by mouth 2 times daily for 5 days    SOB (shortness of breath)  -     XR Chest 2 Views; Future    Chest x-ray within normal limits, rapid flu negative.  Symptoms are highly consistent with influenza and I do think this is a false negative.  Pulse was slightly tachycardic and she had some mild shortness of breath.  No evidence of pneumonia on exam or x-ray.  Refilling patient's nebulizer.  Patient would like Tamiflu and I think this is reasonable given her reactive airway issues when she has upper respiratory infections.    Shukri Yusuf PA-C      SUBJECTIVE:  Misti is a 57 year old female who presents to urgent care with 2 to 3 days of fever, body aches, productive cough, fatigue, malaise, nasal congestion.  Has had wheezing that is worse laying down and some shortness of breath.    ROS: Pertinent ROS neg other than the symptoms noted above in the HPI.     OBJECTIVE:  /70   Pulse 105   Temp 99  F (37.2  C) (Tympanic)   Wt 80.7 kg (178 lb)   LMP 10/09/2011   SpO2 95%   BMI 31.53 kg/m     GENERAL: Tired, alert and no distress  EYES: Eyes grossly normal to inspection, PERRL and conjunctivae and sclerae normal  HENT: ear canals and TM's normal, nose and mouth without ulcers or lesions, no significant oropharynx erythema  RESP: lungs  clear to auscultation - no rales, rhonchi or wheezes, has to stop during sentences at times to take a deep breath  CV: regular rate and rhythm, normal S1 S2, no S3 or S4, no murmur, click or rub    DIAGNOSTICS  Xray - Reviewed and interpreted by me.  No acute cardiopulmonary abnormality, no change when compared to 2018  Results for orders placed or performed in visit on 23   Influenza A & B Antigen - Clinic Collect     Status: Normal    Specimen: Nasopharyngeal; Swab   Result Value Ref Range    Influenza A antigen Negative Negative    Influenza B antigen Negative Negative    Narrative    Test results must be correlated with clinical data. If necessary, results should be confirmed by a molecular assay or viral culture.        Current Outpatient Medications   Medication     albuterol (ACCUNEB) 1.25 MG/3ML neb solution     Cholecalciferol (VITAMIN D3 PO)     fluticasone-salmeterol (ADVAIR DISKUS) 100-50 MCG/DOSE diskus inhaler     ginkgo biloba 60 MG CAPS capsule     ketoconazole (NIZORAL) 2 % cream     Methylcobalamin (B12-ACTIVE PO)     NP THYROID 120 MG tablet     TURMERIC PO     No current facility-administered medications for this visit.      Patient Active Problem List   Diagnosis     DAIN (stress urinary incontinence, female)     Other hammer toe (acquired)     Lipoma of skin and subcutaneous tissue      Past Medical History:   Diagnosis Date     Asthma     exercise induced     Rotator cuff injury     right     Thyroid disease     hypothyroidism     Unspecified symptom associated with female genital organs      Past Surgical History:   Procedure Laterality Date     COLONOSCOPY N/A 2018    Procedure: COLONOSCOPY;  colonoscopy;  Surgeon: Cindy Lares MD;  Location:  GI     GYN SURGERY        section x3 and tubal ligation     SLING TRANSVAGINAL  2011    Procedure:SLING TRANSVAGINAL; Suburethral Soleyx Sling with cysto (LATEX ALLERGY - CONTACT); Surgeon:JEANETTE MAYA; Location:  [facility]     Family History   Problem Relation Age of Onset     Strabismus No family hx of      Social History     Tobacco Use     Smoking status: Never     Smokeless tobacco: Never   Substance Use Topics     Alcohol use: Yes     Comment: 1 X PER MONTH              The plan of care was discussed with the patient. They understand and agree with the course of treatment prescribed. A printed summary was given including instructions and medications.  The use of Dragon/Snootlab dictation services may have been used to construct the content in this note; any grammatical or spelling errors are non-intentional. Please contact the author of this note directly if you are in need of any clarification.

## 2023-01-02 NOTE — PATIENT INSTRUCTIONS
You have a viral upper respiratory infection.  This commonly causes symptoms of your throat, nose, sinuses and bronchi.    You do not need to do anything besides rest and hydrate and your body will get over this.  Sometimes it can take up to 2 weeks to do so.  And the symptoms can be very annoying.    People are commonly contagious for about 3 to 5 days.  Wearing a mask will significantly reduce your risk of transmitting this to someone else if you are within that timeframe.    Some things that she can do to help with her symptoms include:    Pain, malaise and inflammation:  Ibuprofen and Tylenol for pain and inflammation.  I prefer ibuprofen  Ibuprofen 400-600 mg (2-3 of the 200 mg OTC tablets or 400-600 mg of the children's liquid) up to 4 times daily with food or milk  Tylenol 500-1000 mg every 8 hours as needed    For nasal congestion and drainage  Flonase/fluticasone nasal spray 2 sprays in each nostril once a day for 1 - 4 weeks.  This may take several days to become effective.  Consider saline nasal rinses  Psuedofed can help if you tolerate it but do not take if you have high blood pressure     Cough:  Mucinex or guaifenesin can help get mucus out of your body and help with cough.  Dextromethorphan is a cough suppressant that may be helpful  Tessalon Perles may be prescribed    Ear fullness or pain:  Flonase as above  Ibuprofen as above  Otovent, which is a balloon you blow up with your nose and helps pop the ears and regulate the pressure can be bought off of [employer] and works quite well    Supplements that may also be helpful:  Cold snap  Zicam  Zinc    Be sure to eat nutrient dense foods with a good mixture of fats, carbohydrates and proteins.  Your body burns more calories while sick.    Your chest x-ray looked good today.    If you have any worsening shortness of breath or chest pain please go to the emergency room.  If you are not improving after approximately 5 days please return to clinic

## 2025-03-28 ENCOUNTER — ANCILLARY ORDERS (OUTPATIENT)
Dept: BONE DENSITY | Facility: CLINIC | Age: 60
End: 2025-03-28

## 2025-03-28 DIAGNOSIS — Z13.820 SCREENING FOR OSTEOPOROSIS: Primary | ICD-10-CM

## (undated) DEVICE — SU VICRYL 3-0 SH 27" J316H

## (undated) DEVICE — PREP CHLORAPREP W/ORANGE TINT 10.5ML 930715

## (undated) DEVICE — SU MONOCRYL 4-0 PS-2 18" UND Y496G

## (undated) RX ORDER — FENTANYL CITRATE 50 UG/ML
INJECTION, SOLUTION INTRAMUSCULAR; INTRAVENOUS
Status: DISPENSED
Start: 2018-05-11